# Patient Record
Sex: FEMALE | Race: WHITE
[De-identification: names, ages, dates, MRNs, and addresses within clinical notes are randomized per-mention and may not be internally consistent; named-entity substitution may affect disease eponyms.]

---

## 2017-12-20 LAB
CHLORIDE SERPL-SCNC: 109 MMOL/L (ref 98–110)
SODIUM SERPL-SCNC: 140 MMOL/L (ref 136–146)

## 2017-12-21 ENCOUNTER — HOSPITAL ENCOUNTER (OUTPATIENT)
Dept: HOSPITAL 56 - MW.SDS | Age: 37
LOS: 1 days | Discharge: HOME | End: 2017-12-22
Attending: OBSTETRICS & GYNECOLOGY
Payer: COMMERCIAL

## 2017-12-21 DIAGNOSIS — F32.9: ICD-10-CM

## 2017-12-21 DIAGNOSIS — M54.2: ICD-10-CM

## 2017-12-21 DIAGNOSIS — F41.9: ICD-10-CM

## 2017-12-21 DIAGNOSIS — Z88.8: ICD-10-CM

## 2017-12-21 DIAGNOSIS — Z87.891: ICD-10-CM

## 2017-12-21 DIAGNOSIS — Z88.2: ICD-10-CM

## 2017-12-21 DIAGNOSIS — G89.29: ICD-10-CM

## 2017-12-21 DIAGNOSIS — N92.1: Primary | ICD-10-CM

## 2017-12-21 DIAGNOSIS — G43.909: ICD-10-CM

## 2017-12-21 DIAGNOSIS — Z82.49: ICD-10-CM

## 2017-12-21 DIAGNOSIS — N39.3: ICD-10-CM

## 2017-12-21 DIAGNOSIS — Z98.51: ICD-10-CM

## 2017-12-21 DIAGNOSIS — Z88.7: ICD-10-CM

## 2017-12-21 DIAGNOSIS — Z83.3: ICD-10-CM

## 2017-12-21 DIAGNOSIS — Z91.012: ICD-10-CM

## 2017-12-21 DIAGNOSIS — Z79.899: ICD-10-CM

## 2017-12-21 PROCEDURE — 36415 COLL VENOUS BLD VENIPUNCTURE: CPT

## 2017-12-21 PROCEDURE — 58260 VAGINAL HYSTERECTOMY: CPT

## 2017-12-21 PROCEDURE — 94640 AIRWAY INHALATION TREATMENT: CPT

## 2017-12-21 PROCEDURE — 93005 ELECTROCARDIOGRAM TRACING: CPT

## 2017-12-21 PROCEDURE — 86901 BLOOD TYPING SEROLOGIC RH(D): CPT

## 2017-12-21 PROCEDURE — 85025 COMPLETE CBC W/AUTO DIFF WBC: CPT

## 2017-12-21 PROCEDURE — 57288 REPAIR BLADDER DEFECT: CPT

## 2017-12-21 PROCEDURE — 80048 BASIC METABOLIC PNL TOTAL CA: CPT

## 2017-12-21 PROCEDURE — C1781 MESH (IMPLANTABLE): HCPCS

## 2017-12-21 PROCEDURE — 86850 RBC ANTIBODY SCREEN: CPT

## 2017-12-21 PROCEDURE — 84703 CHORIONIC GONADOTROPIN ASSAY: CPT

## 2017-12-21 PROCEDURE — 86900 BLOOD TYPING SEROLOGIC ABO: CPT

## 2017-12-21 PROCEDURE — 85027 COMPLETE CBC AUTOMATED: CPT

## 2017-12-21 RX ADMIN — FENTANYL CITRATE PRN MCG: 50 INJECTION, SOLUTION INTRAMUSCULAR; INTRAVENOUS at 11:54

## 2017-12-21 RX ADMIN — OXYCODONE HYDROCHLORIDE AND ACETAMINOPHEN PRN TAB: 5; 325 TABLET ORAL at 14:16

## 2017-12-21 RX ADMIN — FENTANYL CITRATE PRN MCG: 50 INJECTION, SOLUTION INTRAMUSCULAR; INTRAVENOUS at 12:01

## 2017-12-21 RX ADMIN — KETOROLAC TROMETHAMINE PRN MG: 30 INJECTION, SOLUTION INTRAMUSCULAR at 18:35

## 2017-12-21 RX ADMIN — KETOROLAC TROMETHAMINE PRN MG: 30 INJECTION, SOLUTION INTRAMUSCULAR at 11:53

## 2017-12-21 NOTE — OR
SURGEON:

Gabriel Evans MD

 

DATE OF PROCEDURE:  12/21/2017

 

PREOPERATIVE DIAGNOSIS:

Menometrorrhagia, stress urinary incontinence.

 

POSTOPERATIVE DIAGNOSIS:

Menometrorrhagia, stress urinary incontinence.

 

OPERATION PERFORMED:

Total vaginal hysterectomy and tension-free vaginal tape being Solex tension-

free vaginal tape and cystoscopy.

 

ASSISTANT:

NEY Chavez.

 

ANESTHESIA:

General endotracheal intubation, Bart Camarillo and Dr. Steven.

 

ESTIMATED BLOOD LOSS:

100 mL.

 

COMPLICATIONS:

None.

 

FINDINGS:

Uterus about 8 weeks' size.  Both ovaries essentially normal.  Her tube

previously was removed from her previous tubal ligation.

 

INDICATIONS FOR SURGERY:

Hubbard refer to the admit note.

 

PROCEDURE IN DETAIL:

The patient was brought to the OR, properly identified, and prepped and draped

in sterile fashion as usual and after taken a time-out straight catheter was

used to empty the bladder.  Short weighted speculum placed in the vagina and

then circular incision in the vaginal mucosa was done.  Using electrocautery

port, the posterior cul-de-sac was entered posteriorly with a Hollingsworth scissor and

the peritoneum, and the vagina tacked posteriorly with 2-0 Vicryl and held for

further identification.  The uterosacral ligament was identified from both

sides, clamped with curved Zeppelin, transected, and suture ligated with 2-0

Vicryl pop-off.  Same thing was done with the cardinal ligament and then

cervicovesical space was entered anteriorly.  The bladder retracted completely

away from the operative field and the peritoneal cavity was entered anteriorly.

Broad ligament was clamped with curved zeppelin from both sides, transected, and

suture ligated with 2-0 Vicryl pop-off.  The uterine vessel suture ligated at

this step.  Next, the uterus was delivered posteriorly and then superior pedicle

was clamped with 90-degree zeppelin, transected.  Both ovaries looked normal and

they were preserved.  The superior pedicle was tied twice with 2-0 tie and the

tube was already removed.  The uterosacral ligament and cardinal ligament

anchored to the vagina at 3 and 9 o'clock for added vaginal support and then the

inspection of the operative field shows no oozing, no bleeding.  The vaginal

cuff was closed with 2-0 Vicryl interrupted figure-of-eight suture.  Once these

are done, attention was paid to the anterior vaginal wall and the anterior

vaginal beneath the urethra was infiltrated with copious amount of normal

saline.  It was incised in the midline using electrocautery and dissected in a

tunneling fashion to create a tunnel for the Solyx TVT.  Once that was created,

the Solyx TVT was anchored behind the pubic rami on both sides and with due

amount of tension to elevate the urethrovesical angle, and then we proceeded to

close the anterior vaginal incision with 2-0 Vicryl continuous interlocking

suture.  While we were doing that, we asked Anesthesia to give the patient

fluorescein and then cystoscopy was performed.  The bladder was intact.  Both

ureteric orifices were seen with the dye coming from both of them, thus the

patency of both ureters verified.  Satisfied with these findings, the procedure

ended.  Instrument and sponge count was correct.  The patient tolerated the

procedure well, went to recovery room in stable general condition.

 

 

HIGINIO CHARLES

DD:  12/21/2017 11:33:12

DT:  12/21/2017 12:14:37

Job #:  [id number]

## 2017-12-21 NOTE — PCM.PREANE
Preanesthetic Assessment





- Anesthesia/Transfusion/Family Hx


Anesthesia History: Prior Anesthesia Without Reaction


Family History of Anesthesia Reaction: No


Transfusion History: No Prior Transfusion(s)


Intubation History: Unknown





- Review of Systems


General: No Symptoms


Pulmonary: No Symptoms


Cardiovascular: No Symptoms


Gastrointestinal: No Symptoms


Neurological: No Symptoms


Other: Reports: None





- Physical Assessment


NPO Status Date: 12/20/17


NPO Status Time: 23:00


Height: 1.57 m


Weight: 116.573 kg


ASA Class: 2


Mental Status: Alert & Oriented x3


Airway Class: Mallampati = 2


Dentition: Reports: Broken Tooth/Teeth (x2 upper front incision), Missing Tooth/

Teeth (multiple missing upper and lower on borh sides)


Thyro-Mental Finger Breadths: 3


Mouth Opening Finger Breadths: 3


ROM/Head Extension: Full


Lungs: Clear to Auscultation, Normal Respiratory Effort


Cardiovascular: Regular Rate, Regular Rhythm





- Lab


Values: 





 Laboratory Last Values











WBC  10.15 K/uL (4.0-11.0)   12/20/17  08:45    


 


RBC  4.32 M/uL (4.30-5.90)   12/20/17  08:45    


 


Hgb  13.0 g/dL (12.0-16.0)   12/20/17  08:45    


 


Hct  38.2 % (36.0-46.0)   12/20/17  08:45    


 


MCV  88.4 fL (80.0-98.0)   12/20/17  08:45    


 


MCH  30.1 pg (27.0-32.0)   12/20/17  08:45    


 


MCHC  34.0 g/dL (31.0-37.0)   12/20/17  08:45    


 


RDW Std Deviation  42.6 fl (28.0-62.0)   12/20/17  08:45    


 


RDW Coeff of Rachid  13 % (11.0-15.0)   12/20/17  08:45    


 


Plt Count  283 K/uL (150-400)   12/20/17  08:45    


 


MPV  10.60 fL (7.40-12.00)   12/20/17  08:45    


 


Nucleated RBC %  0.0 /100WBC  12/20/17  08:45    


 


Nucleated RBCs #  0 K/uL  12/20/17  08:45    


 


Sodium  140 mmol/L (136-146)   12/20/17  08:45    


 


Potassium  3.9 mmol/L (3.5-5.1)   12/20/17  08:45    


 


Chloride  109 mmol/L ()   12/20/17  08:45    


 


Carbon Dioxide  24 mmol/L (21-31)   12/20/17  08:45    


 


BUN  9 mg/dL (6.0-23.0)   12/20/17  08:45    


 


Creatinine  0.9 mg/dL (0.6-1.5)   12/20/17  08:45    


 


Est Cr Clr Drug Dosing  68.35 mL/min  12/20/17  08:45    


 


Estimated GFR (MDRD)  > 60.0 ml/min  12/20/17  08:45    


 


Glucose  100 mg/dL ()   12/20/17  08:45    


 


Calcium  8.7 mg/dL (8.8-10.8)  L  12/20/17  08:45    


 


HCG, Qual  NEGATIVE  (NEG)   12/20/17  08:45    


 


Blood Type  B POSITIVE   12/20/17  08:45    


 


Antibody Screen  NEGATIVE   12/20/17  08:45    














- Allergies


Allergies/Adverse Reactions: 


 Allergies











Allergy/AdvReac Type Severity Reaction Status Date / Time


 


Influenza Virus Vaccines Allergy  Anaphylactic Verified 12/18/17 16:23





   Shock  


 


methylprednisolone Allergy  Rash Verified 12/18/17 16:23


 


Sulfa (Sulfonamide Allergy  Rash Verified 12/18/17 16:23





Antibiotics)     


 


eggs Allergy  Hives Uncoded 12/18/17 16:23














- Blood


Blood Available: No





- Anesthesia Plan


Pre-Op Medication Ordered: None





- Acknowledgements


Anesthesia Type Planned: General Anesthesia


Pt an Appropriate Candidate for the Planned Anesthesia: Yes


Alternatives and Risks of Anesthesia Discussed w Pt/Guardian: Yes


Pt/Guardian Understands and Agrees with Anesthesia Plan: Yes





PreAnesthesia Questionnaire


Other HEENT History: wears glasses/contacts


Respiratory History: Reports: Asthma (mainly exercize induced)


OB/GYN History: Reports: Endometriosis, Polycystic Ovaries


Musculoskeletal History: Reports: Back Pain, Chronic, Neck Pain, Chronic


Neurological History: Reports: Migraines


Psychiatric History: Reports: Anxiety, Depression


Endocrine/Metabolic History: Reports: Obesity/BMI 30+


Dermatologic History: Reports: Psoriasis





- Past Surgical History


GI Surgical History: Reports: Appendectomy, Cholecystectomy


Female  Surgical History: Reports: Tubal Ligation, Other (See Below)


Other Female  Surgeries/Procedures: laparoscopies x2





- SUBSTANCE USE


Smoking Status *Q: Former Smoker


Tobacco Use Within Last Twelve Months: Cigarettes


Recreational Drug Use History: No





- HOME MEDS


Home Medications: 


 Home Meds





Acetaminophen 1,000 mg PO Q6H PRN 12/18/17 [History]


Albuterol Sulfate [Proair Hfa] 1 - 2 puff INH Q4H PRN 12/18/17 [History]


Albuterol [Ventolin HFA] 1 - 2 puff INH Q4H PRN 12/18/17 [History]


Beclomethasone Dipropionate [Qvar] 1 puff INH BID 12/18/17 [History]


Celecoxib 200 mg PO DAILY 12/18/17 [History]


DULoxetine HCl [Duloxetine HCl] 90 mg PO DAILY 12/18/17 [History]


Ipratropium/Albuterol Sulfate [Iprat-Albut 0.5-3(2.5) mg/3 ml] 1 dose INH QID 12 /18/17 [History]


Methocarbamol 750 mg PO QID PRN 12/18/17 [History]


Topiramate 100 mg PO TID 12/18/17 [History]











- CURRENT (IN HOUSE) MEDS


Current Meds: 





 Current Medications





Lactated Ringer's (Ringers, Lactated)  1,000 mls @ 125 mls/hr IV ASDIRECTED JONI


   Last Admin: 12/21/17 09:02 Dose:  125 mls/hr


Sodium Chloride (Saline Flush)  10 ml FLUSH ASDIRECTED PRN


   PRN Reason: Keep Vein Open


Sodium Chloride (Saline Flush)  2.5 ml FLUSH ASDIRECTED PRN


   PRN Reason: Keep Vein Open





Discontinued Medications





Albuterol/Ipratropium (Duoneb 3.0-0.5 Mg/3 Ml)  3 ml NEB ONETIME ONE


   Stop: 12/21/17 07:13


Fluorescein Sodium (Ak-Fluor) Confirm Administered Dose 5 ml .ROUTE .STK-MED ONE


   Stop: 12/21/17 07:23


Cefazolin Sodium/Dextrose 2 gm (/ Premix)  50 mls @ 100 mls/hr IV ONETIME ONE


   Stop: 12/20/17 08:57

## 2017-12-21 NOTE — PCM.OPNOTE
- General Post-Op/Procedure Note


Date of Surgery/Procedure: 12/21/17


Operative Procedure(s): TVH,TVT and Cysto


Pre Op Diagnosis: Bleeding, JOAN


Post-Op Diagnosis: Same


Anesthesia Technique: General ET Tube


Primary Surgeon: Gabriel Evans


Assistant: Em Glez


EBL in mLs: 100


Complications: None


Condition: Good

## 2017-12-21 NOTE — PCM.POSTAN
POST ANESTHESIA ASSESSMENT





- MENTAL STATUS


Mental Status: Alert, Oriented





- RESPIRATORY


Respiratory Status: Respiratory Rate WNL, Airway Patent, O2 Saturation Stable





- CARDIOVASCULAR


CV Status: Pulse Rate WNL, Blood Pressure Stable





- GASTROINTESTINAL


GI Status: No Symptoms





- PAIN


Pain Score: 2





- POST OP HYDRATION


Hydration Status: Adequate & Stable





- OBSERVATIONS


Free Text/Narrative:: 





no anesthesia problems

## 2017-12-22 LAB
CHLORIDE SERPL-SCNC: 106 MMOL/L (ref 98–110)
SODIUM SERPL-SCNC: 137 MMOL/L (ref 136–146)

## 2017-12-22 RX ADMIN — OXYCODONE HYDROCHLORIDE AND ACETAMINOPHEN PRN TAB: 5; 325 TABLET ORAL at 10:51

## 2017-12-22 RX ADMIN — KETOROLAC TROMETHAMINE PRN MG: 30 INJECTION, SOLUTION INTRAMUSCULAR at 02:03

## 2017-12-22 NOTE — [id number]
Post Anesthesia Note





- EVALUATION WITHIN 48HRS OF ANESTHETIC


Vital Signs in Normal Range: Yes


Patient Participated in Evaluation: Yes


Respiratory Function Stable: Yes


Airway Patent: Yes


Cardiovascular Function Stable: Yes


Hydration Status Stable: Yes


Pain Control Satisfactory: Yes (States having bladder spasms)


Nausea and Vomiting Control Satisfactory: Yes (Eating crackers at bedside)


Mental Status Recovered: Yes

## 2017-12-22 NOTE — PCM.SURGPN
- General Info


Date of Service: 12/22/17


POD#: 1


Functional Status: Reports: Pain Controlled





- Review of Systems


General: Reports: No Symptoms


HEENT: Reports: No Symptoms


Pulmonary: Reports: No Symptoms


Cardiovascular: Reports: No Symptoms


Gastrointestinal: Reports: No Symptoms


Genitourinary: Reports: No Symptoms


Musculoskeletal: Reports: No Symptoms


Skin: Reports: No Symptoms


Neurological: Reports: No Symptoms


Psychiatric: Reports: No Symptoms





- Patient Data


Vitals - Most Recent: 


 Last Vital Signs











Temp  35.6 C   12/22/17 08:32


 


Pulse  83   12/22/17 08:32


 


Resp  20   12/22/17 08:32


 


BP  127/89   12/22/17 08:32


 


Pulse Ox  96   12/22/17 08:32











Weight - Most Recent: 116.573 kg


I&O - Last 24 Hours: 


 Intake & Output











 12/21/17 12/22/17 12/22/17





 22:59 06:59 14:59


 


Intake Total 653 2089 


 


Output Total 550 2150 


 


Balance 103 -61 











Lab Results Last 24 Hrs: 


 Laboratory Results - last 24 hr











  12/22/17 12/22/17 Range/Units





  05:14 05:14 


 


WBC  15.38 H   (4.0-11.0)  K/uL


 


RBC  3.84 L   (4.30-5.90)  M/uL


 


Hgb  11.6 L   (12.0-16.0)  g/dL


 


Hct  33.6 L   (36.0-46.0)  %


 


MCV  87.5   (80.0-98.0)  fL


 


MCH  30.2   (27.0-32.0)  pg


 


MCHC  34.5   (31.0-37.0)  g/dL


 


RDW Std Deviation  40.8   (28.0-62.0)  fl


 


RDW Coeff of Rachid  13   (11.0-15.0)  %


 


Plt Count  280   (150-400)  K/uL


 


MPV  10.40   (7.40-12.00)  fL


 


Neut % (Auto)  75.0   (48.0-80.0)  %


 


Lymph % (Auto)  17.6   (16.0-40.0)  %


 


Mono % (Auto)  7.3   (0.0-15.0)  %


 


Eos % (Auto)  0.0   (0.0-7.0)  %


 


Baso % (Auto)  0.1   (0.0-1.5)  %


 


Neut # (Auto)  11.5 H   (1.4-5.7)  K/uL


 


Lymph # (Auto)  2.7 H   (0.6-2.4)  K/uL


 


Mono # (Auto)  1.1 H   (0.0-0.8)  K/uL


 


Eos # (Auto)  0.0   (0.0-0.7)  K/uL


 


Baso # (Auto)  0.0   (0.0-0.1)  K/uL


 


Nucleated RBC %  0.0   /100WBC


 


Nucleated RBCs #  0   K/uL


 


Sodium   137  (136-146)  mmol/L


 


Potassium   3.7  (3.5-5.1)  mmol/L


 


Chloride   106  ()  mmol/L


 


Carbon Dioxide   24  (21-31)  mmol/L


 


BUN   6  (6.0-23.0)  mg/dL


 


Creatinine   0.7  (0.6-1.5)  mg/dL


 


Est Cr Clr Drug Dosing   87.87  mL/min


 


Estimated GFR (MDRD)   > 60.0  ml/min


 


Glucose   109  ()  mg/dL


 


Calcium   8.8  (8.8-10.8)  mg/dL











Med Orders - Current: 


 Current Medications





Fentanyl (Sublimaze)  50 mcg IVPUSH Q5M PRN


   PRN Reason: Pain (severe 7-10)


   Stop: 12/22/17 09:38


   Last Admin: 12/21/17 12:01 Dose:  50 mcg


Lactated Ringer's (Ringers, Lactated)  1,000 mls @ 125 mls/hr IV ASDIRECTED [facility]


   Last Admin: 12/21/17 22:16 Dose:  125 mls/hr


Ketorolac Tromethamine (Toradol)  30 mg IVPUSH Q6H PRN


   PRN Reason: Pain (severe 7-10)


   Stop: 12/26/17 11:24


   Last Admin: 12/22/17 02:03 Dose:  30 mg


Morphine Sulfate (Morphine)  2 mg IVPUSH Q2H PRN


   PRN Reason: Pain (severe 7-10)


   Last Admin: 12/21/17 16:23 Dose:  2 mg


Morphine Sulfate (Morphine)  4 mg IVPUSH Q2H PRN


   PRN Reason: Pain (severe 7-10)


   Last Admin: 12/21/17 12:17 Dose:  4 mg


Ondansetron HCl (Zofran)  4 mg IVPUSH Q6H PRN


   PRN Reason: Nausea/Vomiting


   Last Admin: 12/21/17 21:05 Dose:  4 mg


Oxycodone/Acetaminophen (Percocet 325-5 Mg)  1 tab PO Q4H PRN


   PRN Reason: Pain (moderate 4-6)


   Last Admin: 12/21/17 21:03 Dose:  1 tab


Oxycodone/Acetaminophen (Percocet 325-5 Mg)  2 tab PO Q4H PRN


   PRN Reason: Pain (moderate 4-6)


   Last Admin: 12/21/17 14:16 Dose:  2 tab


Promethazine HCl (Phenergan)  25 mg IM Q6H PRN


   PRN Reason: Nausea/Vomiting


Sodium Chloride (Saline Flush)  10 ml FLUSH ASDIRECTED PRN


   PRN Reason: Keep Vein Open


Sodium Chloride (Saline Flush)  2.5 ml FLUSH ASDIRECTED PRN


   PRN Reason: Keep Vein Open





Discontinued Medications





Albuterol (Proventil Neb Soln) Confirm Administered Dose 2.5 mg .ROUTE .STK-MED 

ONE


   Stop: 12/21/17 09:30


   Last Admin: 12/21/17 09:32 Dose:  Not Given


Albuterol/Ipratropium (Duoneb 3.0-0.5 Mg/3 Ml)  3 ml NEB ONETIME ONE


   Stop: 12/21/17 07:13


   Last Admin: 12/21/17 09:32 Dose:  3 ml


Albuterol/Ipratropium (Duoneb 3.0-0.5 Mg/3 Ml) Confirm Administered Dose 3 ml 

.ROUTE .STK-MED ONE


   Stop: 12/21/17 09:32


   Last Admin: 12/21/17 13:05 Dose:  Not Given


Belladonna Alkaloids/Opium (B & O Supprettes No. 15a)  1 supp RECTAL ONETIME ONE


   Stop: 12/21/17 12:25


   Last Admin: 12/21/17 12:30 Dose:  1 supp


Belladonna Alkaloids/Opium (B & O Supprettes No. 15a) Confirm Administered Dose 

1 supp .ROUTE .STK-MED ONE


   Stop: 12/21/17 12:26


   Last Admin: 12/21/17 13:05 Dose:  Not Given


Cefazolin Sodium (Ancef) Confirm Administered Dose 2 gm .ROUTE .STK-MED ONE


   Stop: 12/21/17 09:48


Dexamethasone (Dexamethasone) Confirm Administered Dose 20 mg .ROUTE .STK-MED 

ONE


   Stop: 12/21/17 10:49


Ephedrine Sulfate (Ephedrine Sulfate) Confirm Administered Dose 50 mg .ROUTE 

.STK-MED ONE


   Stop: 12/21/17 10:50


Fentanyl (Sublimaze) Confirm Administered Dose 250 mcg .ROUTE .STK-MED ONE


   Stop: 12/21/17 09:46


Fentanyl (Sublimaze) Confirm Administered Dose 100 mcg .ROUTE .STK-MED ONE


   Stop: 12/21/17 10:41


Fentanyl (Sublimaze) Confirm Administered Dose 100 mcg .ROUTE .STK-MED ONE


   Stop: 12/21/17 11:02


Fluorescein Sodium (Ak-Fluor) Confirm Administered Dose 5 ml .ROUTE .STK-MED ONE


   Stop: 12/21/17 07:23


Furosemide (Lasix) Confirm Administered Dose 40 mg .ROUTE .STK-MED ONE


   Stop: 12/21/17 10:39


Glycopyrrolate () Confirm Administered Dose 1 mg .ROUTE .STK-MED ONE


   Stop: 12/21/17 10:42


Cefazolin Sodium/Dextrose 2 gm (/ Premix)  50 mls @ 100 mls/hr IV ONETIME ONE


   Stop: 12/20/17 08:57


   Last Admin: 12/21/17 13:05 Dose:  Not Given


Sodium Chloride (Normal Saline) Confirm Administered Dose 20 mls @ as directed 

.ROUTE .STK-MED ONE


   Stop: 12/21/17 09:48


Ketorolac Tromethamine (Toradol)  30 mg IVPUSH ONETIME ONE


   Stop: 12/21/17 11:25


   Last Admin: 12/21/17 13:05 Dose:  Not Given


Lidocaine (Xylocaine-Mpf 2%) Confirm Administered Dose 5 ml .ROUTE .STK-MED ONE


   Stop: 12/21/17 09:45


Midazolam HCl (Versed 1 Mg/Ml) Confirm Administered Dose 2 mg .ROUTE .STK-MED 

ONE


   Stop: 12/21/17 09:46


Neostigmine Methylsulfate (Neostigmine) Confirm Administered Dose 5 mg .ROUTE 

.STK-MED ONE


   Stop: 12/21/17 10:42


Ondansetron HCl (Zofran) Confirm Administered Dose 4 mg .ROUTE .STK-MED ONE


   Stop: 12/21/17 09:45


Propofol (Diprivan  20 Ml) Confirm Administered Dose 200 mg .ROUTE .STK-MED ONE


   Stop: 12/21/17 09:46


Rocuronium Bromide (Zemuron) Confirm Administered Dose 100 mg .ROUTE .STK-MED 

ONE


   Stop: 12/21/17 09:45


Succinylcholine Chloride (Succinylcholine In Ns Pf) Confirm Administered Dose 

200 mg .ROUTE .STK-MED ONE


   Stop: 12/21/17 09:45











- Exam


Wound/Incisions: Healing Well


General: Alert, Oriented


HEENT: Pupils Equal


Neck: Supple


Lungs: Clear to Auscultation, Normal Respiratory Effort


Cardiovascular: Regular Rate, Regular Rhythm


GI/Abdominal Exam: Normal Bowel Sounds, Soft, Non-Tender, No Organomegaly, No 

Distention, No Abnormal Bruit, No Mass, Pelvis Stable


Extremities: Normal Inspection, Normal Range of Motion, Non-Tender, No Pedal 

Edema, Normal Capillary Refill


Skin: Warm, Dry, Intact


Neurological: No New Focal Deficit


Psy/Mental Status: Alert, Normal Affect, Normal Mood





- Problem List Review


Problem List Initiated/Reviewed/Updated: Yes





- My Orders


Last 24 Hours: 


 Active Orders 24 hr











 Category Date Time Status


 


 Patient Status [ADT] Routine ADT  12/21/17 11:24 Active


 


 Notify Provider Vital Signs [RC] ASDIRECTED Care  12/21/17 11:24 Active


 


 Oxygen Therapy [RC] ASDIRECTED Care  12/21/17 11:24 Active


 


 RT Incentive Spirometry [RC] Q2HWA Care  12/21/17 11:24 Active


 


 Up With Assistance [RC] PER UNIT ROUTINE Care  12/21/17 11:24 Active


 


 Up ad Consuelo [RC] PER UNIT ROUTINE Care  12/21/17 11:24 Active


 


 Regular Diet [DIET] Diet  12/21/17 Dinner Active


 


 Acetaminophen/oxyCODONE [Percocet 325-5 MG] Med  12/21/17 11:24 Active





 1 tab PO Q4H PRN   


 


 Acetaminophen/oxyCODONE [Percocet 325-5 MG] Med  12/21/17 11:24 Active





 2 tab PO Q4H PRN   


 


 Ketorolac [Toradol] Med  12/21/17 11:24 Active





 30 mg IVPUSH Q6H PRN   


 


 Morphine Med  12/21/17 11:24 Active





 2 mg IVPUSH Q2H PRN   


 


 Morphine Med  12/21/17 11:24 Active





 4 mg IVPUSH Q2H PRN   


 


 Ondansetron [Zofran] Med  12/21/17 11:24 Active





 4 mg IVPUSH Q6H PRN   


 


 Promethazine [Phenergan] Med  12/21/17 11:24 Active





 25 mg IM Q6H PRN   


 


 fentaNYL [Sublimaze] Med  12/21/17 09:38 Active





 50 mcg IVPUSH Q5M PRN   


 


 Peripheral IV Discontinue [OM.PC] Routine Oth  12/21/17 11:24 Ordered


 


 Sequential Compression Device [OM.PC] Per Unit Routine Oth  12/21/17 11:24 

Ordered


 


 Resuscitation Status Routine Resus Stat  12/21/17 11:24 Ordered








 Medication Orders





Fentanyl (Sublimaze)  50 mcg IVPUSH Q5M PRN


   PRN Reason: Pain (severe 7-10)


   Stop: 12/22/17 09:38


   Last Admin: 12/21/17 12:01  Dose: 50 mcg


   Admin: 12/21/17 11:54  Dose: 50 mcg


Lactated Ringer's (Ringers, Lactated)  1,000 mls @ 125 mls/hr IV ASDIRECTED JONI


   Last Admin: 12/21/17 22:16  Dose: 125 mls/hr


   Infusion: 12/21/17 21:28  Dose: 125 mls/hr


   Admin: 12/21/17 13:28  Dose: 125 mls/hr


   Infusion: 12/21/17 13:28  Dose: 125 mls/hr


   Admin: 12/21/17 09:02  Dose: 125 mls/hr


Ketorolac Tromethamine (Toradol)  30 mg IVPUSH Q6H PRN


   PRN Reason: Pain (severe 7-10)


   Stop: 12/26/17 11:24


   Last Admin: 12/22/17 02:03  Dose: 30 mg


   Admin: 12/21/17 18:35  Dose: 30 mg


   Admin: 12/21/17 11:53  Dose: 30 mg


Morphine Sulfate (Morphine)  2 mg IVPUSH Q2H PRN


   PRN Reason: Pain (severe 7-10)


   Last Admin: 12/21/17 16:23  Dose: 2 mg


Morphine Sulfate (Morphine)  4 mg IVPUSH Q2H PRN


   PRN Reason: Pain (severe 7-10)


   Last Admin: 12/21/17 12:17  Dose: 4 mg


Ondansetron HCl (Zofran)  4 mg IVPUSH Q6H PRN


   PRN Reason: Nausea/Vomiting


   Last Admin: 12/21/17 21:05  Dose: 4 mg


Oxycodone/Acetaminophen (Percocet 325-5 Mg)  1 tab PO Q4H PRN


   PRN Reason: Pain (moderate 4-6)


   Last Admin: 12/21/17 21:03  Dose: 1 tab


Oxycodone/Acetaminophen (Percocet 325-5 Mg)  2 tab PO Q4H PRN


   PRN Reason: Pain (moderate 4-6)


   Last Admin: 12/21/17 14:16  Dose: 2 tab


Promethazine HCl (Phenergan)  25 mg IM Q6H PRN


   PRN Reason: Nausea/Vomiting


Sodium Chloride (Saline Flush)  10 ml FLUSH ASDIRECTED PRN


   PRN Reason: Keep Vein Open


Sodium Chloride (Saline Flush)  2.5 ml FLUSH ASDIRECTED PRN


   PRN Reason: Keep Vein Open











- Assessment


Assessment           (Free Text/Narrative):: 





Status post vaginal hysterectomy and patient is doing well on regular diet and 

voiding without any problem no vaginal bleeding her lab work is within normal 

limits





- Plan


Plan                        (Free Text/Narrative):: 





I'm sending the patient home today prescription for Percocet 7.5/325 for 

postoperative pain is given she is to come to see me in the office in 2 weeks

## 2017-12-22 NOTE — [id number].DCSUM1
**Discharge Summary





- Discharge Data


Discharge Date: 12/22/17


Discharge Disposition: Home, Self-Care 01


Condition: Good





- Patient Summary/Data


Operative Procedure(s) Performed: TVH,TVT and Cysto





- Patient Instructions


Diet: Usual Diet as Tolerated


Activity: As Tolerated


Driving: Do Not Drive


Showering/Bathing: May Shower


Notify Provider of: Fever, Increased Pain, Nausea and/or Vomiting





- Discharge Plan


Home Medications: 


 Home Meds





Acetaminophen 1,000 mg PO Q6H PRN 12/18/17 [History]


Albuterol Sulfate [Proair Hfa] 1 - 2 puff INH Q4H PRN 12/18/17 [History]


Albuterol [Ventolin HFA] 1 - 2 puff INH Q4H PRN 12/18/17 [History]


Beclomethasone Dipropionate [Qvar] 1 puff INH BID 12/18/17 [History]


Celecoxib 200 mg PO DAILY 12/18/17 [History]


DULoxetine HCl [Duloxetine HCl] 90 mg PO DAILY 12/18/17 [History]


Ipratropium/Albuterol Sulfate [Iprat-Albut 0.5-3(2.5) mg/3 ml] 1 dose INH QID 12 /18/17 [History]


Methocarbamol 750 mg PO QID PRN 12/18/17 [History]


Topiramate 100 mg PO TID 12/18/17 [History]








Patient Handouts:  Acetaminophen; Oxycodone tablets, Laparoscopically Assisted 

Vaginal Hysterectomy, Care After


Referrals: 


Gabriel Evans MD [Physician] - 01/02/18 10:45 am





- General Info


Date of Service: 12/22/17


Functional Status: Reports: Pain Controlled





- Review of Systems


General: Reports: No Symptoms


HEENT: Reports: No Symptoms


Pulmonary: Reports: No Symptoms


Cardiovascular: Reports: No Symptoms


Gastrointestinal: Reports: No Symptoms


Genitourinary: Reports: No Symptoms


Musculoskeletal: Reports: No Symptoms


Skin: Reports: No Symptoms


Neurological: Reports: No Symptoms


Psychiatric: Reports: No Symptoms





- Patient Data


Vitals - Most Recent: 


 Last Vital Signs











Temp  35.6 C   12/22/17 08:32


 


Pulse  83   12/22/17 08:32


 


Resp  20   12/22/17 08:32


 


BP  127/89   12/22/17 08:32


 


Pulse Ox  96   12/22/17 08:32











Weight - Most Recent: 116.573 kg


I&O - Last 24 hours: 


 Intake & Output











 12/21/17 12/22/17 12/22/17





 22:59 06:59 14:59


 


Intake Total 653 2089 


 


Output Total 550 2150 


 


Balance 103 -61 











Lab Results - Last 24 hrs: 


 Laboratory Results - last 24 hr











  12/22/17 12/22/17 Range/Units





  05:14 05:14 


 


WBC  15.38 H   (4.0-11.0)  K/uL


 


RBC  3.84 L   (4.30-5.90)  M/uL


 


Hgb  11.6 L   (12.0-16.0)  g/dL


 


Hct  33.6 L   (36.0-46.0)  %


 


MCV  87.5   (80.0-98.0)  fL


 


MCH  30.2   (27.0-32.0)  pg


 


MCHC  34.5   (31.0-37.0)  g/dL


 


RDW Std Deviation  40.8   (28.0-62.0)  fl


 


RDW Coeff of Rachid  13   (11.0-15.0)  %


 


Plt Count  280   (150-400)  K/uL


 


MPV  10.40   (7.40-12.00)  fL


 


Neut % (Auto)  75.0   (48.0-80.0)  %


 


Lymph % (Auto)  17.6   (16.0-40.0)  %


 


Mono % (Auto)  7.3   (0.0-15.0)  %


 


Eos % (Auto)  0.0   (0.0-7.0)  %


 


Baso % (Auto)  0.1   (0.0-1.5)  %


 


Neut # (Auto)  11.5 H   (1.4-5.7)  K/uL


 


Lymph # (Auto)  2.7 H   (0.6-2.4)  K/uL


 


Mono # (Auto)  1.1 H   (0.0-0.8)  K/uL


 


Eos # (Auto)  0.0   (0.0-0.7)  K/uL


 


Baso # (Auto)  0.0   (0.0-0.1)  K/uL


 


Nucleated RBC %  0.0   /100WBC


 


Nucleated RBCs #  0   K/uL


 


Sodium   137  (136-146)  mmol/L


 


Potassium   3.7  (3.5-5.1)  mmol/L


 


Chloride   106  ()  mmol/L


 


Carbon Dioxide   24  (21-31)  mmol/L


 


BUN   6  (6.0-23.0)  mg/dL


 


Creatinine   0.7  (0.6-1.5)  mg/dL


 


Est Cr Clr Drug Dosing   87.87  mL/min


 


Estimated GFR (MDRD)   > 60.0  ml/min


 


Glucose   109  ()  mg/dL


 


Calcium   8.8  (8.8-10.8)  mg/dL











Med Orders - Current: 


 Current Medications





Fentanyl (Sublimaze)  50 mcg IVPUSH Q5M PRN


   PRN Reason: Pain (severe 7-10)


   Stop: 12/22/17 09:38


   Last Admin: 12/21/17 12:01 Dose:  50 mcg


Lactated Ringer's (Ringers, Lactated)  1,000 mls @ 125 mls/hr IV ASDIRECTED [facility]


   Last Admin: 12/21/17 22:16 Dose:  125 mls/hr


Ketorolac Tromethamine (Toradol)  30 mg IVPUSH Q6H PRN


   PRN Reason: Pain (severe 7-10)


   Stop: 12/26/17 11:24


   Last Admin: 12/22/17 02:03 Dose:  30 mg


Morphine Sulfate (Morphine)  2 mg IVPUSH Q2H PRN


   PRN Reason: Pain (severe 7-10)


   Last Admin: 12/21/17 16:23 Dose:  2 mg


Morphine Sulfate (Morphine)  4 mg IVPUSH Q2H PRN


   PRN Reason: Pain (severe 7-10)


   Last Admin: 12/21/17 12:17 Dose:  4 mg


Ondansetron HCl (Zofran)  4 mg IVPUSH Q6H PRN


   PRN Reason: Nausea/Vomiting


   Last Admin: 12/21/17 21:05 Dose:  4 mg


Oxycodone/Acetaminophen (Percocet 325-5 Mg)  1 tab PO Q4H PRN


   PRN Reason: Pain (moderate 4-6)


   Last Admin: 12/21/17 21:03 Dose:  1 tab


Oxycodone/Acetaminophen (Percocet 325-5 Mg)  2 tab PO Q4H PRN


   PRN Reason: Pain (moderate 4-6)


   Last Admin: 12/21/17 14:16 Dose:  2 tab


Promethazine HCl (Phenergan)  25 mg IM Q6H PRN


   PRN Reason: Nausea/Vomiting


Sodium Chloride (Saline Flush)  10 ml FLUSH ASDIRECTED PRN


   PRN Reason: Keep Vein Open


Sodium Chloride (Saline Flush)  2.5 ml FLUSH ASDIRECTED PRN


   PRN Reason: Keep Vein Open





Discontinued Medications





Albuterol (Proventil Neb Soln) Confirm Administered Dose 2.5 mg .ROUTE .STK-MED 

ONE


   Stop: 12/21/17 09:30


   Last Admin: 12/21/17 09:32 Dose:  Not Given


Albuterol/Ipratropium (Duoneb 3.0-0.5 Mg/3 Ml)  3 ml NEB ONETIME ONE


   Stop: 12/21/17 07:13


   Last Admin: 12/21/17 09:32 Dose:  3 ml


Albuterol/Ipratropium (Duoneb 3.0-0.5 Mg/3 Ml) Confirm Administered Dose 3 ml 

.ROUTE .STK-MED ONE


   Stop: 12/21/17 09:32


   Last Admin: 12/21/17 13:05 Dose:  Not Given


Belladonna Alkaloids/Opium (B & O Supprettes No. 15a)  1 supp RECTAL ONETIME ONE


   Stop: 12/21/17 12:25


   Last Admin: 12/21/17 12:30 Dose:  1 supp


Belladonna Alkaloids/Opium (B & O Supprettes No. 15a) Confirm Administered Dose 

1 supp .ROUTE .STK-MED ONE


   Stop: 12/21/17 12:26


   Last Admin: 12/21/17 13:05 Dose:  Not Given


Cefazolin Sodium (Ancef) Confirm Administered Dose 2 gm .ROUTE .STK-MED ONE


   Stop: 12/21/17 09:48


Dexamethasone (Dexamethasone) Confirm Administered Dose 20 mg .ROUTE .STK-MED 

ONE


   Stop: 12/21/17 10:49


Ephedrine Sulfate (Ephedrine Sulfate) Confirm Administered Dose 50 mg .ROUTE 

.STK-MED ONE


   Stop: 12/21/17 10:50


Fentanyl (Sublimaze) Confirm Administered Dose 250 mcg .ROUTE .STK-MED ONE


   Stop: 12/21/17 09:46


Fentanyl (Sublimaze) Confirm Administered Dose 100 mcg .ROUTE .STK-MED ONE


   Stop: 12/21/17 10:41


Fentanyl (Sublimaze) Confirm Administered Dose 100 mcg .ROUTE .STK-MED ONE


   Stop: 12/21/17 11:02


Fluorescein Sodium (Ak-Fluor) Confirm Administered Dose 5 ml .ROUTE .STK-MED ONE


   Stop: 12/21/17 07:23


Furosemide (Lasix) Confirm Administered Dose 40 mg .ROUTE .STK-MED ONE


   Stop: 12/21/17 10:39


Glycopyrrolate () Confirm Administered Dose 1 mg .ROUTE .STK-MED ONE


   Stop: 12/21/17 10:42


Cefazolin Sodium/Dextrose 2 gm (/ Premix)  50 mls @ 100 mls/hr IV ONETIME ONE


   Stop: 12/20/17 08:57


   Last Admin: 12/21/17 13:05 Dose:  Not Given


Sodium Chloride (Normal Saline) Confirm Administered Dose 20 mls @ as directed 

.ROUTE .STK-MED ONE


   Stop: 12/21/17 09:48


Ketorolac Tromethamine (Toradol)  30 mg IVPUSH ONETIME ONE


   Stop: 12/21/17 11:25


   Last Admin: 12/21/17 13:05 Dose:  Not Given


Lidocaine (Xylocaine-Mpf 2%) Confirm Administered Dose 5 ml .ROUTE .STK-MED ONE


   Stop: 12/21/17 09:45


Midazolam HCl (Versed 1 Mg/Ml) Confirm Administered Dose 2 mg .ROUTE .STK-MED 

ONE


   Stop: 12/21/17 09:46


Neostigmine Methylsulfate (Neostigmine) Confirm Administered Dose 5 mg .ROUTE 

.STK-MED ONE


   Stop: 12/21/17 10:42


Ondansetron HCl (Zofran) Confirm Administered Dose 4 mg .ROUTE .STK-MED ONE


   Stop: 12/21/17 09:45


Propofol (Diprivan  20 Ml) Confirm Administered Dose 200 mg .ROUTE .STK-MED ONE


   Stop: 12/21/17 09:46


Rocuronium Bromide (Zemuron) Confirm Administered Dose 100 mg .ROUTE .STK-MED 

ONE


   Stop: 12/21/17 09:45


Succinylcholine Chloride (Succinylcholine In Ns Pf) Confirm Administered Dose 

200 mg .ROUTE .STK-MED ONE


   Stop: 12/21/17 09:45











- Exam


General: Reports: Alert, Oriented


HEENT: Reports: Pupils Equal, Pupils Reactive, EOMI, Mucous Membr. Moist/Pink


Neck: Reports: Supple


Lungs: Reports: Clear to Auscultation, Normal Respiratory Effort


Cardiovascular: Reports: Regular Rate, Regular Rhythm


GI/Abdominal Exam: Normal Bowel Sounds, Soft, Non-Tender, No Organomegaly, No 

Distention, No Abnormal Bruit, No Mass, Pelvis Stable


 (Female) Exam: Normal External Exam, Normal Speculum Exam, Normal Bimanual 

Exam


Rectal (Female) Exam: Normal Exam, Normal Rectal Tone


Back Exam: Reports: Normal Inspection, Full Range of Motion


Extremities: Normal Inspection, Normal Range of Motion, Non-Tender, No Pedal 

Edema, Normal Capillary Refill


Skin: Reports: Warm, Dry, Intact


Wound/Incisions: Reports: Healing Well


Neurological: Reports: No New Focal Deficit


Psy/Mental Status: Reports: Alert, Normal Affect, Normal Mood





*Q Meaningful Use (DIS)





- VTE *Q


VTE Criteria *Q: 








- Stroke *Q


Stroke Criteria *Q: 








- AMI *Q


AMI Criteria *Q:

## 2018-04-05 ENCOUNTER — HOSPITAL ENCOUNTER (OUTPATIENT)
Dept: HOSPITAL 56 - MW.SDS | Age: 38
Discharge: HOME | End: 2018-04-05
Attending: SURGERY
Payer: COMMERCIAL

## 2018-04-05 DIAGNOSIS — E66.9: ICD-10-CM

## 2018-04-05 DIAGNOSIS — F32.9: ICD-10-CM

## 2018-04-05 DIAGNOSIS — Z90.49: ICD-10-CM

## 2018-04-05 DIAGNOSIS — Z88.8: ICD-10-CM

## 2018-04-05 DIAGNOSIS — Z87.891: ICD-10-CM

## 2018-04-05 DIAGNOSIS — F41.9: ICD-10-CM

## 2018-04-05 DIAGNOSIS — K21.9: ICD-10-CM

## 2018-04-05 DIAGNOSIS — K31.89: ICD-10-CM

## 2018-04-05 DIAGNOSIS — E28.2: ICD-10-CM

## 2018-04-05 DIAGNOSIS — Z98.890: ICD-10-CM

## 2018-04-05 DIAGNOSIS — J45.909: ICD-10-CM

## 2018-04-05 DIAGNOSIS — Z88.7: ICD-10-CM

## 2018-04-05 DIAGNOSIS — K52.9: Primary | ICD-10-CM

## 2018-04-05 PROCEDURE — 43239 EGD BIOPSY SINGLE/MULTIPLE: CPT

## 2018-04-05 PROCEDURE — 45378 DIAGNOSTIC COLONOSCOPY: CPT

## 2018-04-05 NOTE — OR
SURGEON:

MAGUE FRENCH MD

 

DATE OF PROCEDURE:  04/05/2018

 

PREOPERATIVE DIAGNOSES:

1. Reflux.

2. Chronic diarrhea.

 

POSTOPERATIVE DIAGNOSES:

Normal esophagogastroduodenoscopy and colonoscopy.

 

ENDOSCOPIST:

Mague French MD.

 

ANESTHESIA:

Monitored anesthesia care.

 

INSTRUMENT USED:

Olympus endoscope and colonoscope.

 

EXTENT OF EXAM:

To the second portion of duodenum, to the cecum.

 

PREPARATION:

Fair.

 

LIMITATIONS:

None.

 

INDICATION FOR EXAMINATION:

The patient is a 37-year-old female, who presents with chronic diarrhea.  She

had her gallbladder removed several years ago and ever since then, has been

having 10 to 20 loose bowel movements per day.  On further questioning, the

patient also complains of reflux, which she does not treat with any over-the-

counter medications.  The patient and I discussed the need for diagnostic EGD

and colonoscopy.  We discussed the procedures as well as the expected

perioperative course.  We discussed the risks including bleeding, infection, or

damage to surrounding structures including perforation.  The patient verbalized

understanding and wishes to proceed.

 

PROCEDURE IN DETAIL:

The patient was brought to the endoscopy suite and placed in the left lateral

decubitus position.  A time-out was completed verifying the patient's name, age,

date of birth, allergies, and procedure to be performed.  Monitored anesthesia

care was induced and a bite block was placed in the patient's mouth.  Continuous

oxygen was provided via nasal cannula throughout the procedure.  After adequate

sedation was achieved, a well lubricated endoscope was placed in the patient's

mouth and advanced under direct visualization to the second portion of the

duodenum.  This appeared normal and a photograph was taken.  The scope was then

fully withdrawn while examining the color, texture, anatomy, and integrity of

the mucosa of the upper GI tract.  The duodenum appeared normal.  The scope was

then brought into the stomach.  Photograph was taken of the pylorus as well as

the GE junction.  The patient had some bilious appearing clear fluid in the

stomach at the time of the scope.  This was suctioned out.  The gastric mucosa

was free of inflammation or any ulceration.  Biopsies were taken of the gastric

antrum, body, and fundus, and sent for H. pylori testing and histologic review.

The scope was then brought into the distal esophagus.  A photograph was taken of

the GE junction, which appeared normal.  I closely inspected the distal

esophageal mucosa.  There was no evidence of esophagitis.  The remainder of the

esophageal mucosa appeared normal.  The scope was removed and this portion of

procedure was terminated.  A digital rectal exam was performed.  This exam was

within normal limits.  A well lubricated colonoscope was inserted in the rectum

and advanced under direct visualization to the level of the cecum.  The cecum

was identified by both visual and anatomic landmarks.  A photograph was taken of

the cecal cap.  I was unable to retroflex the scope within the cecum due to

looping of the scope more proximally.  The scope was then fully withdrawn while

examining the color, texture, anatomy, integrity, mucosa from the cecum to the

anal canal.  The patient had a fair prep and required an irrigation in order to

adequately visualize the walls of the colon.  After careful inspection, however,

the colonic mucosa appeared normal with no evidence of inflammation or

ulceration.  No polyps were seen.  The scope was then brought into the rectum

and retroflexed to allow visualization of the anal canal opening.  This appeared

normal and a photograph was taken.  The scope was then straightened out and

removed from the patient.  The cecum to anus time was 7 minutes.  The patient

tolerated the procedure well and was taken to PACU in stable condition.

 

ENDOSCOPIC DIAGNOSES:

Normal appearing esophagogastroduodenoscopy and colonoscopy.  Follow up with the

patient in clinic in 2 weeks to discuss these findings.

 

 

YESIKA CHARLES

DD:  04/05/2018 09:57:49

DT:  04/05/2018 13:06:21

Job #:  [id number]

## 2018-04-05 NOTE — [id number]
Post Anesthesia Note





- EVALUATION WITHIN 48HRS OF ANESTHETIC


Vital Signs in Normal Range: Yes


Patient Participated in Evaluation: Yes


Respiratory Function Stable: Yes


Airway Patent: Yes


Cardiovascular Function Stable: Yes


Hydration Status Stable: Yes


Pain Control Satisfactory: Yes


Nausea and Vomiting Control Satisfactory: Yes


Mental Status Recovered: Yes


Resp Rate: 19





- COMMENTS/OBSERVATIONS


Free Text/Narrative:: 





no anesthesia problems

## 2018-04-05 NOTE — PCM.OPNOTE
- General Post-Op/Procedure Note


Date of Surgery/Procedure: 04/05/18


Operative Procedure(s): Diagnostic EGD and colonoscopy


Findings: 


Normal EGD and colonoscopy 


Pre Op Diagnosis: GERD, chronic diarrhea


Post-Op Diagnosis: Normal EGD and colonoscopy


Anesthesia Technique: MAC


Primary Surgeon: Mague French


Condition: Good

## 2019-03-17 ENCOUNTER — HOSPITAL ENCOUNTER (EMERGENCY)
Dept: HOSPITAL 56 - MW.ED | Age: 39
Discharge: HOME | End: 2019-03-17
Payer: COMMERCIAL

## 2019-03-17 DIAGNOSIS — Z91.012: ICD-10-CM

## 2019-03-17 DIAGNOSIS — F32.9: ICD-10-CM

## 2019-03-17 DIAGNOSIS — Z79.899: ICD-10-CM

## 2019-03-17 DIAGNOSIS — Z88.8: ICD-10-CM

## 2019-03-17 DIAGNOSIS — Z88.7: ICD-10-CM

## 2019-03-17 DIAGNOSIS — Z88.2: ICD-10-CM

## 2019-03-17 DIAGNOSIS — F41.9: ICD-10-CM

## 2019-03-17 DIAGNOSIS — J40: Primary | ICD-10-CM

## 2019-03-17 PROCEDURE — 99285 EMERGENCY DEPT VISIT HI MDM: CPT

## 2019-03-17 PROCEDURE — 94640 AIRWAY INHALATION TREATMENT: CPT

## 2019-03-17 PROCEDURE — 71046 X-RAY EXAM CHEST 2 VIEWS: CPT

## 2019-03-17 NOTE — EDM.PDOC
ED HPI GENERAL MEDICAL PROBLEM





- General


Chief Complaint: Respiratory Problem


Stated Complaint: HEADACHE


Time Seen by Provider: 03/17/19 03:10


Source of Information: Reports: Patient





- History of Present Illness


INITIAL COMMENTS - FREE TEXT/NARRATIVE: 





HISTORY AND PHYSICAL:


History of present illness:


[Patient with asthma presents with persistent we she does have duo nebs at home 

and is on a steroid she did been seen by her primary in the last week and 

declined an antibiotic at that time





She has improved with DuoNeb and Solu-Medrol here





No fever nausea vomiting chills sweats able speak in full sentences nonlabored 

breathing] patient does have a persistent cough





Review of systems: 


As per history of present illness and below otherwise all systems reviewed and 

negative.


Past medical history: 


As per history of present illness and as reviewed below otherwise 

noncontributory.


Surgical history: 


As per history of present illness and as reviewed below otherwise 

noncontributory.


Social history: 


No reported history of drug or alcohol abuse.


Family history: 


As per history of present illness and as reviewed below otherwise 

noncontributory.


Physical exam:


HEENT: Atraumatic, normocephalic, pupils reactive, negative for conjunctival 

pallor or scleral icterus, mucous membranes moist, throat clear, neck supple, 

nontender, trachea midline.


Lungs: Clear to auscultation on inspiration slight end expiratory wheeze, 

breath sounds equal bilaterally, chest nontender.


Heart: S1S2, regular, negative for clicks, rubs, or JVD.


Abdomen: Soft, nondistended, nontender. Negative for masses or 

hepatosplenomegaly. Negative for costovertebral tenderness.


Pelvis: Stable nontender.


Genitourinary: Deferred.


Rectal: Deferred.


Extremities: Atraumatic, negative for cords or calf pain. Neurovascular 

unremarkable.


Neuro: Awake, alert, oriented. Cranial nerves II through XII unremarkable. 

Cerebellum unremarkable. Motor and sensory unremarkable throughout. Exam 

nonfocal.





Diagnostics:


[Chest x-ray plain films


]


Therapeutics:


[DuoNeb


Solu-Medrol 125 mg IM


Azithromycin 500 mg by mouth now


] Juni





Impression: 


[ bronchitis


Chronic history of baseline ]


Definitive disposition and diagnosis as appropriate pending reevaluation and 

review of above.


  ** chest from coughing


Pain Score (Numeric/FACES): 2





- Related Data


 Allergies











Allergy/AdvReac Type Severity Reaction Status Date / Time


 


Influenza Virus Vaccines Allergy  Anaphylactic Verified 04/02/18 14:32





   Shock  


 


methylprednisolone Allergy  Vomiting Verified 04/02/18 14:32


 


Sulfa (Sulfonamide Allergy  Rash Verified 04/02/18 14:32





Antibiotics)     


 


eggs Allergy  Hives Uncoded 04/02/18 14:32











Home Meds: 


 Home Meds





Albuterol Sulfate [Proair Hfa] 1 - 2 puff INH Q4H PRN 12/18/17 [History]


DULoxetine HCl [Duloxetine HCl] 120 mg PO DAILY 12/18/17 [History]


Methocarbamol 750 mg PO QID PRN 12/18/17 [History]


Topiramate 100 mg PO TID 12/18/17 [History]


LORazepam 0.5 - 1 mg PO DAILY PRN 04/02/18 [History]











Past Medical History


HEENT History: Reports: Impaired Vision


Other HEENT History: wears glasses/contacts


Respiratory History: Reports: Asthma


Other Respiratory History: uses rescue inhaler mainly in the winter


Gastrointestinal History: Reports: Chronic Diarrhea, GERD, Helicobacter Pylori


OB/GYN History: Reports: Endometriosis, Polycystic Ovaries


Musculoskeletal History: Reports: Back Pain, Chronic, Neck Pain, Chronic


Neurological History: Reports: Migraines


Psychiatric History: Reports: Anxiety, Depression


Endocrine/Metabolic History: Reports: Obesity/BMI 30+


Dermatologic History: Reports: Psoriasis





- Infectious Disease History


Infectious Disease History: Reports: Chicken Pox, Shingles





- Past Surgical History


GI Surgical History: Reports: Appendectomy, Cholecystectomy


Female  Surgical History: Reports: Hysterectomy


Other Female  Surgeries/Procedures: laparoscopies x2





Social & Family History





- Family History


Family Medical History: Noncontributory





- Tobacco Use


Smoking Status *Q: Never Smoker





- Caffeine Use


Caffeine Use: Reports: Coffee





- Recreational Drug Use


Recreational Drug Use: No





ED ROS GENERAL





- Review of Systems


Review Of Systems: See Below





ED EXAM, GENERAL





- Physical Exam


Exam: See Below





Course





- Vital Signs


Last Recorded V/S: 


 Last Vital Signs











Temp  96.9 F   03/17/19 02:44


 


Pulse  98   03/17/19 02:44


 


Resp  16   03/17/19 02:44


 


BP  143/96 H  03/17/19 02:44


 


Pulse Ox  98   03/17/19 02:44














- Orders/Labs/Meds


Orders: 


 Active Orders 24 hr











 Category Date Time Status


 


 RT Aerosol Therapy [RC] ASDIRECTED Care  03/17/19 03:06 Active


 


 Azithromycin [Zithromax] Med  03/17/19 04:20 Stat





 500 mg PO NOW STA   











Meds: 


Medications














Discontinued Medications














Generic Name Dose Route Start Last Admin





  Trade Name Landry  PRN Reason Stop Dose Admin


 


Albuterol/Ipratropium  3 ml  03/17/19 03:06  03/17/19 03:11





  Duoneb 3.0-0.5 Mg/3 Ml  NEB  03/17/19 03:07  3 ml





  ONETIME ONE   Administration





     





     





     





     














Departure





- Departure


Time of Disposition: 04:22


Disposition: Home, Self-Care 01


Condition: Good


Clinical Impression: 


 Bronchitis, Pulmonary infiltrate on chest x-ray








- Discharge Information


Referrals: 


PCP,None [Primary Care Provider] - 


Forms:  ED Department Discharge


Additional Instructions: 


The following information is given to patients seen in the emergency department 

who are being discharged to home. This information is to outline your options 

for follow-up care. We provide all patients seen in our emergency department 

with a follow-up referral.





The need for follow-up, as well as the timing and circumstances, are variable 

depending upon the specifics of your emergency department visit.





If you don't have a primary care physician on staff, we will provide you with a 

referral. We always advise you to contact your personal physician following an 

emergency department visit to inform them of the circumstance of the visit and 

for follow-up with them and/or the need for any referrals to a consulting 

specialist.





The emergency department will also refer you to a specialist when appropriate. 

This referral assures that you have the opportunity for follow-up care with a 

specialist. All of these measure are taken in an effort to provide you with 

optimal care, which includes your follow-up.





Under all circumstances we always encourage you to contact your private 

physician who remains a resource for coordinating your care. When calling for 

follow-up care, please make the office aware that this follow-up is from your 

recent emergency room visit. If for any reason you are refused follow-up, 

please contact the [facility] emergency department at [phone] 

and asked to speak to the emergency department charge nurse.








- My Orders


Last 24 Hours: 


My Active Orders





03/17/19 03:06


RT Aerosol Therapy [RC] ASDIRECTED 





03/17/19 04:20


Azithromycin [Zithromax]   500 mg PO NOW STA 














- Assessment/Plan


Last 24 Hours: 


My Active Orders





03/17/19 03:06


RT Aerosol Therapy [RC] ASDIRECTED 





03/17/19 04:20


Azithromycin [Zithromax]   500 mg PO NOW STA

## 2019-03-17 NOTE — CR
INDICATION:



Shortness of breath



TECHNIQUE:



Chest 2 views.



COMPARISON:



None



FINDINGS:



Lung volumes are low which accentuates the cardiac size. Normal pulmonary 

vasculature. No focal infiltrate, effusion, or pneumothorax. No acute 

osseous abnormality. 



IMPRESSION:



No sign of acute disease.



Dictated by Simona Arambula MD @ Mar 17 2019  3:44AM



Signed by Dr. Simona Arambula @ Mar 17 2019  3:44AM

## 2019-06-13 ENCOUNTER — HOSPITAL ENCOUNTER (OUTPATIENT)
Dept: HOSPITAL 56 - MW.SDS | Age: 39
End: 2019-06-13
Attending: PAIN MEDICINE
Payer: COMMERCIAL

## 2019-06-13 DIAGNOSIS — M12.88: Primary | ICD-10-CM

## 2019-06-13 PROCEDURE — G0260 INJ FOR SACROILIAC JT ANESTH: HCPCS

## 2019-06-13 NOTE — OR
SURGEON:

Sinai Fox D.O.

 

DATE OF PROCEDURE:  2019

 

PRIMARY SURGEON:

Sinai Fox D.O.

 

OPERATING ROOM STAFF PRESENT:

1. ALEXA Bello.

2. NURY Jerez RN.

3. STERLING Morillo RT.

 

WOUND CLASS:

I.

 

PREOPERATIVE DIAGNOSIS:

Right sacroiliac joint arthropathy.

 

POSTOPERATIVE DIAGNOSIS:

Right sacroiliac joint arthropathy.

 

PROCEDURE PERFORMED:

1. Right sacroiliac joint injection.

2. Fluoroscopic guidance for needle placement.

3. Local with oral Valium for sedation.

 

SCREENING QUESTIONS:

The patient answered "No" to all the followin. Are you allergic to iodine, Betadine or latex?

2. Do you have a bleeding disorder?

3. Do you  have any joint replacements, heart valve replacements or a

    pacemaker?

4. Are you on any anti-inflammatories or blood thinners?

5. Do you have any current local or systemic infections?

 

MEDICAL NECESSITY:

This is a patient with a history of severe chronic  low back pain and sacroiliac

joint irritation with pain over the sacral sulcus and the buttocks region who

comes in for the above diagnostic and therapeutic procedure.  Please see medical

necessity note attached.  This procedure is being done in accordance with

guidelines as written by the [employer] (ROSSANA).

 

DESCRIPTION OF PROCEDURE:

The patient had the procedure thoroughly explained including all possible risks,

benefits and alternatives.  Consent was signed in my clinic indicating

understanding and willingness to proceed.

 

The patient presented to the outpatient [facility] and was escorted to the

dressing room to disrobe and change into a hospital gown.  Preoperative vital

signs were taken and stable.  The patient reported that Valium was taken prior

to the procedure.

 

The patient was brought to the procedure room and placed in the prone position

on the procedure room table.  A pillow was placed under the hips in order to

flatten the lumbar lordosis.  The back was prepped with ChloraPrep and sterilely

draped.

 

All personnel in the operating room were dressed in appropriate attire including

surgical scrubs, head and shoe covers.  This was to ensure sterility while in

the treatment room.  During the time fluoroscopy was in use all personnel in the

operating room wore lead shields with thyroid collars.  Sterile technique was

used during the procedure.  The patient was awake and conversant throughout the

procedure.

 

The fluoroscope was positioned to provide an oblique view of the sacroiliac

joint.  There was no evidence of infection at the site of needle insertion.  The

skin was anesthetized with 2% Lidocaine with a sterile 27-gauge 1.5 inch needle.

Then under fluoroscopy  a 22-gauge 3.5 inch spinal needle was placed within the

sacroiliac joint in the lower one-third of the joint.  IsoVue-200 contrast dye

was injected under live fluoroscopy and no intravascular flow pattern was

observed.  After negative aspiration of heme, the following solution was

injected: 0.5% Ropivacaine, Celestone and 2% Lidocaine.

 

The patient tolerated the procedure well and vital signs were stable during and

after the procedure.

 

The staff escorted the patient to the recovery area and the patient was released

to home in stable condition after a brief stay in the recovery room monitored by

the nurse.  The patient was given both oral and written discharge and follow up

instructions.

 

Recommended follow up in two weeks.  The patient is able to contact the office

if there are any additional problems or questions in the meantime.  The patient

was given discharge instruction and verbalizes understanding including

understanding of those signs and symptoms that would require emergency care.

 

PREOPERATIVE PAIN:

10.

 

POSTOPERATIVE PAIN:

1/10.

 

FOLLOWUP:

Follow up in the Pain Clinic in 3 weeks.

 

 

FAITH / ARACELI

DD:  2019 14:08:01

DT:  2019 20:11:52

Job #:  [id number]

## 2020-01-26 ENCOUNTER — HOSPITAL ENCOUNTER (EMERGENCY)
Dept: HOSPITAL 56 - MW.ED | Age: 40
Discharge: HOME | End: 2020-01-26
Payer: COMMERCIAL

## 2020-01-26 DIAGNOSIS — Z91.012: ICD-10-CM

## 2020-01-26 DIAGNOSIS — Z88.2: ICD-10-CM

## 2020-01-26 DIAGNOSIS — F41.9: ICD-10-CM

## 2020-01-26 DIAGNOSIS — Z79.899: ICD-10-CM

## 2020-01-26 DIAGNOSIS — J45.901: ICD-10-CM

## 2020-01-26 DIAGNOSIS — J15.9: Primary | ICD-10-CM

## 2020-01-26 DIAGNOSIS — E66.9: ICD-10-CM

## 2020-01-26 DIAGNOSIS — F17.210: ICD-10-CM

## 2020-01-26 DIAGNOSIS — F32.9: ICD-10-CM

## 2020-01-26 DIAGNOSIS — Z88.7: ICD-10-CM

## 2020-01-26 DIAGNOSIS — Z88.8: ICD-10-CM

## 2020-01-26 LAB
BUN SERPL-MCNC: 20 MG/DL (ref 7–18)
CHLORIDE SERPL-SCNC: 101 MMOL/L (ref 98–107)
CO2 SERPL-SCNC: 21.2 MMOL/L (ref 21–32)
GLUCOSE SERPL-MCNC: 95 MG/DL (ref 74–106)
POTASSIUM SERPL-SCNC: 4.3 MMOL/L (ref 3.5–5.1)
SODIUM SERPL-SCNC: 135 MMOL/L (ref 136–145)

## 2020-01-26 PROCEDURE — 81003 URINALYSIS AUTO W/O SCOPE: CPT

## 2020-01-26 PROCEDURE — 36415 COLL VENOUS BLD VENIPUNCTURE: CPT

## 2020-01-26 PROCEDURE — 85025 COMPLETE CBC W/AUTO DIFF WBC: CPT

## 2020-01-26 PROCEDURE — 84484 ASSAY OF TROPONIN QUANT: CPT

## 2020-01-26 PROCEDURE — 96360 HYDRATION IV INFUSION INIT: CPT

## 2020-01-26 PROCEDURE — 99285 EMERGENCY DEPT VISIT HI MDM: CPT

## 2020-01-26 PROCEDURE — 93005 ELECTROCARDIOGRAM TRACING: CPT

## 2020-01-26 PROCEDURE — 80053 COMPREHEN METABOLIC PANEL: CPT

## 2020-01-26 PROCEDURE — 94640 AIRWAY INHALATION TREATMENT: CPT

## 2020-01-26 PROCEDURE — 71046 X-RAY EXAM CHEST 2 VIEWS: CPT

## 2020-01-26 NOTE — EDM.PDOC
ED HPI GENERAL MEDICAL PROBLEM





- General


Chief Complaint: Respiratory Problem


Stated Complaint: BREATHING PROBLEM


Time Seen by Provider: 01/26/20 18:21


Source of Information: Reports: Patient


History Limitations: Reports: No Limitations





- History of Present Illness


INITIAL COMMENTS - FREE TEXT/NARRATIVE: 


HISTORY AND PHYSICAL:





History of present illness:


Patient is a 39-year-old female who presents to the ED today with concern of 

cough making her feel short of breath.  Patient states she has a history of 

asthma and that she has been having issues with the cough over the past 4 to 5 

days.  Patient states she also feels as if she has had a fever but has not 

checked a temperature at home.  Patient states that she did call her primary 

care provider and was put on a steroid.  Patient states that she was not seen 

or evaluated by the primary care provider.  Patient states she is taking medrol 

dose pack and this is the third day of taking it.  Patient states that despite 

taking the medrol dose pack she feels as if she has been more wheezy and has 

coughing attacks which makes her feel short of breath.  Patient denies any 

other symptoms or concerns.





Patient denies chest pain. Denies headache, neck stiff ness, change in vision, 

syncope, or near syncope. Denies nausea, vomiting, abdominal pain, diarrhea, 

constipation, or dysuria. Has not noted any blood in urine or stool. Patient 

has been eating and drinking appropriately.





Review of systems: 


As per history of present illness and below otherwise all systems reviewed and 

negative.





Past medical history: 


As per history of present illness and as reviewed below otherwise 

noncontributory.





Surgical history: 


As per history of present illness and as reviewed below otherwise 

noncontributory.





Social history: 


See social history for further information





Family history: 


As per history of present illness and as reviewed below otherwise 

noncontributory.





Physical exam:


General: Patient is alert, oriented, and in no acute distress. Patient sitting 

comfortably on exam table breathing comfortably.


HEENT: Atraumatic, normocephalic, pupils equal and reactive bilaterally, 

negative for conjunctival pallor or scleral icterus, mucous membranes moist, 

TMs normal bilaterally, throat clear, neck supple, nontender, trachea midline. 

No drooling or trismus noted. No meningeal signs. No hot potato voice noted. 


Lungs: Diffuse wheezing to auscultation, breath sounds equal bilaterally, chest 

nontender.


Heart: S1S2, regular rate and rhythm without overt murmur


Abdomen: Soft, nondistended, nontender. Negative for masses or 

hepatosplenomegaly. Negative for costovertebral tenderness.


Pelvis: Stable nontender.


Genitourinary: Deferred.


Rectal: Deferred.


Skin: Intact, warm, dry. No lesions or rashes noted.


Extremities: Atraumatic, negative for cords or calf pain. Neurovascular 

unremarkable.


Neuro: Awake, alert, oriented. Cranial nerves II through XII unremarkable. 

Cerebellum unremarkable. Motor and sensory unremarkable throughout. Exam 

nonfocal.





Notes:


Patient 98% on RA. Initially tachycardic at 120s upon arrival. Following 

therapeutics, rate 95.


Admission for observation was offered to haley  but patient declines. All 

risks vs benefits discussed with patient and expresses understanding.


Patient states she does have 3 more nebulizers at home and would need a new 

prescription for these.


Patient currently on medrol dose pack but will switch to Prednisone.


Discussed importance for follow-up with a primary care provider.


Voices understanding and is agreeable to plan of care. Denies any further 

questions or concerns at this time.





Diagnostics:


CBC, CMP, UA, EKG, CXR, Influenza





Therapeutics:


Duoneb, NS





Prescription:


Prednisone, Duonebs, Levaquin





Impression: 


Community acquired pneumonia, left lobe


Asthma exacerbation





Plan:


1.  Take medication as prescribed.  Stop the Medrol Dosepak that has been 

prescribed to you and take new prescription.


2.  You can alternate ibuprofen and Tylenol as directed for pain and discomfort.


3.  Use your asthma medications as prescribed to you as directed and as 

discussed.


4.  Return to the ED as needed and as discussed.





Definitive disposition and diagnosis as appropriate pending reevaluation and 

review of above.





  ** Chest


Pain Score (Numeric/FACES): 5





- Related Data


 Allergies











Allergy/AdvReac Type Severity Reaction Status Date / Time


 


Influenza Virus Vaccines Allergy  Anaphylactic Verified 01/26/20 18:15





   Shock  


 


methylprednisolone Allergy  Vomiting Verified 01/26/20 18:15





[From Solu-Medrol]     


 


Sulfa (Sulfonamide Allergy  Rash Verified 01/26/20 18:15





Antibiotics)     


 


eggs Allergy  Hives Uncoded 01/26/20 18:15











Home Meds: 


 Home Meds





Albuterol Sulfate [Proair Hfa] 1 - 2 puff INH Q4H PRN 12/18/17 [History]


DULoxetine HCl [Duloxetine HCl] 120 mg PO DAILY 12/18/17 [History]


Topiramate 100 mg PO TID 12/18/17 [History]


methocarbamoL [Methocarbamol] 750 mg PO QID PRN 12/18/17 [History]


LORazepam 0.5 - 1 mg PO DAILY PRN 04/02/18 [History]


Albuterol [Proventil Neb Soln] 3 ml NEB ASDIRECTED 01/26/20 [History]


Albuterol/Ipratropium [DuoNeb 3.0-0.5 MG/3 ML] 3 ml NEB ASDIRECTED 01/26/20 [

History]


methylPREDNISolone [Medrol Dose Pack] mg PO ASDIRECTED 01/26/20 [History]











Past Medical History


HEENT History: Reports: Impaired Vision


Other HEENT History: wears glasses/contacts


Respiratory History: Reports: Asthma


Other Respiratory History: uses rescue inhaler mainly in the winter


Gastrointestinal History: Reports: Chronic Diarrhea, GERD, Helicobacter Pylori


OB/GYN History: Reports: Endometriosis, Polycystic Ovaries


Musculoskeletal History: Reports: Back Pain, Chronic, Neck Pain, Chronic


Neurological History: Reports: Migraines


Psychiatric History: Reports: Anxiety, Depression


Endocrine/Metabolic History: Reports: Obesity/BMI 30+


Dermatologic History: Reports: Psoriasis





- Infectious Disease History


Infectious Disease History: Reports: Chicken Pox





- Past Surgical History


GI Surgical History: Reports: Appendectomy, Cholecystectomy


Female  Surgical History: Reports: Hysterectomy


Other Female  Surgeries/Procedures: laparoscopies x2





Social & Family History





- Family History


Family Medical History: Noncontributory





- Tobacco Use


Smoking Status *Q: Current Every Day Smoker


Years of Tobacco use: 26


Packs/Tins Daily: 0.5





- Caffeine Use


Caffeine Use: Reports: Coffee, Soda, Tea





- Recreational Drug Use


Recreational Drug Use: No





ED ROS GENERAL





- Review of Systems


Review Of Systems: Comprehensive ROS is negative, except as noted in HPI.





ED EXAM, GENERAL





- Physical Exam


Exam: See Below (see dictation)





Course





- Vital Signs


Last Recorded V/S: 


 Last Vital Signs











Temp  96.8 F   01/26/20 18:16


 


Pulse  105 H  01/26/20 19:08


 


Resp  21 H  01/26/20 19:08


 


BP  117/87   01/26/20 19:08


 


Pulse Ox  99   01/26/20 19:08














- Orders/Labs/Meds


Orders: 


 Active Orders 24 hr











 Category Date Time Status


 


 EKG Documentation Completion [RC] STAT Care  01/26/20 18:26 Active


 


 RT Aerosol Therapy [RC] ASDIRECTED Care  01/26/20 18:27 Active











Labs: 


 Laboratory Tests











  01/26/20 01/26/20 01/26/20 Range/Units





  18:34 18:34 19:35 


 


WBC  13.01 H    (4.0-11.0)  K/uL


 


RBC  5.01    (4.30-5.90)  M/uL


 


Hgb  15.6    (12.0-16.0)  g/dL


 


Hct  45.3    (36.0-46.0)  %


 


MCV  90.4    (80.0-98.0)  fL


 


MCH  31.1    (27.0-32.0)  pg


 


MCHC  34.4    (31.0-37.0)  g/dL


 


RDW Std Deviation  46.3    (28.0-62.0)  fl


 


RDW Coeff of Rachid  14    (11.0-15.0)  %


 


Plt Count  282    (150-400)  K/uL


 


MPV  10.60    (7.40-12.00)  fL


 


Neut % (Auto)  79.6    (48.0-80.0)  %


 


Lymph % (Auto)  8.3 L    (16.0-40.0)  %


 


Mono % (Auto)  11.8    (0.0-15.0)  %


 


Eos % (Auto)  0.1    (0.0-7.0)  %


 


Baso % (Auto)  0.2    (0.0-1.5)  %


 


Neut # (Auto)  10.4 H    (1.4-5.7)  K/uL


 


Lymph # (Auto)  1.1    (0.6-2.4)  K/uL


 


Mono # (Auto)  1.5 H    (0.0-0.8)  K/uL


 


Eos # (Auto)  0.0    (0.0-0.7)  K/uL


 


Baso # (Auto)  0.0    (0.0-0.1)  K/uL


 


Nucleated RBC %  0.0    /100WBC


 


Nucleated RBCs #  0    K/uL


 


Sodium   135 L   (136-145)  mmol/L


 


Potassium   4.3   (3.5-5.1)  mmol/L


 


Chloride   101   ()  mmol/L


 


Carbon Dioxide   21.2   (21.0-32.0)  mmol/L


 


BUN   20 H   (7.0-18.0)  mg/dL


 


Creatinine   1.1 H   (0.6-1.0)  mg/dL


 


Est Cr Clr Drug Dosing   57.36   mL/min


 


Estimated GFR (MDRD)   55.6   ml/min


 


Glucose   95   ()  mg/dL


 


Calcium   9.4   (8.5-10.1)  mg/dL


 


Total Bilirubin   0.3   (0.2-1.0)  mg/dL


 


AST   18   (15-37)  IU/L


 


ALT   28   (14-63)  IU/L


 


Alkaline Phosphatase   113   ()  U/L


 


Troponin I   < 0.050   (0.000-0.056)  ng/mL


 


Total Protein   8.2   (6.4-8.2)  g/dL


 


Albumin   3.7   (3.4-5.0)  g/dL


 


Globulin   4.5 H   (2.6-4.0)  g/dL


 


Albumin/Globulin Ratio   0.8 L   (0.9-1.6)  


 


Urine Color    YELLOW  


 


Urine Appearance    CLEAR  


 


Urine pH    6.0  (5.0-8.0)  


 


Ur Specific Gravity    1.010  (1.001-1.035)  


 


Urine Protein    NEGATIVE  (NEGATIVE)  mg/dL


 


Urine Glucose (UA)    NEGATIVE  (NEGATIVE)  mg/dL


 


Urine Ketones    NEGATIVE  (NEGATIVE)  mg/dL


 


Urine Occult Blood    NEGATIVE  (NEGATIVE)  


 


Urine Nitrite    NEGATIVE  (NEGATIVE)  


 


Urine Bilirubin    NEGATIVE  (NEGATIVE)  


 


Urine Urobilinogen    0.2  (<2.0)  EU/dL


 


Ur Leukocyte Esterase    NEGATIVE  (NEGATIVE)  











Meds: 


Medications














Discontinued Medications














Generic Name Dose Route Start Last Admin





  Trade Name Landry  PRN Reason Stop Dose Admin


 


Albuterol/Ipratropium  3 ml  01/26/20 18:27  01/26/20 18:31





  Duoneb 3.0-0.5 Mg/3 Ml  NEB  01/26/20 18:28  3 ml





  ONETIME ONE   Administration





     





     





     





     


 


Sodium Chloride  1,000 mls @ 999 mls/hr  01/26/20 18:26  01/26/20 18:39





  Normal Saline  IV  01/26/20 19:26  999 mls/hr





  BOLUS ONE   Administration





     





     





     





     














Departure





- Departure


Time of Disposition: 19:52


Disposition: Home, Self-Care 01


Clinical Impression: 


 Community acquired bacterial pneumonia





Asthma exacerbation


Qualifiers:


 Asthma severity: unspecified severity Asthma persistence: unspecified 

Qualified Code(s): J45.901 - Unspecified asthma with (acute) exacerbation








- Discharge Information


Referrals: 


Duy Ortega MD [Primary Care Provider] - 


Forms:  ED Department Discharge


Additional Instructions: 


The following information is given to patients seen in the emergency department 

who are being discharged to home. This information is to outline your options 

for follow-up care. We provide all patients seen in our emergency department 

with a follow-up referral.





The need for follow-up, as well as the timing and circumstances, are variable 

depending upon the specifics of your emergency department visit.





If you don't have a primary care physician on staff, we will provide you with a 

referral. We always advise you to contact your personal physician following an 

emergency department visit to inform them of the circumstance of the visit and 

for follow-up with them and/or the need for any referrals to a consulting 

specialist.





The emergency department will also refer you to a specialist when appropriate. 

This referral assures that you have the opportunity for follow-up care with a 

specialist. All of these measure are taken in an effort to provide you with 

optimal care, which includes your follow-up.





Under all circumstances we always encourage you to contact your private 

physician who remains a resource for coordinating your care. When calling for 

follow-up care, please make the office aware that this follow-up is from your 

recent emergency room visit. If for any reason you are refused follow-up, 

please contact the [facility] Emergency 

Department at [phone] and asked to speak to the emergency department 

charge nurse.





[facility]


Primary Care


[address] [place] [id number]


Phone: [phone]


Fax: [phone]





[address] [address]


Phone: [phone]


Fax: [phone]





1.  Take medication as prescribed.  Stop the Medrol Dosepak that has been 

prescribed to you and take new prescription.


2.  You can alternate ibuprofen and Tylenol as directed for pain and discomfort.


3.  Use your asthma medications as prescribed to you as directed and as 

discussed.


4.  Return to the ED as needed and as discussed.





 











Sepsis Event Note





- Evaluation


Sepsis Screening Result: No Definite Risk





- Focused Exam


Vital Signs: 


 Vital Signs











  Temp Pulse Resp BP Pulse Ox


 


 01/26/20 19:08   105 H  21 H  117/87  99


 


 01/26/20 18:16  96.8 F  114 H  18  149/96 H  98











Date Exam was Performed: 01/26/20


Time Exam was Performed: 19:48





- My Orders


Last 24 Hours: 


My Active Orders





01/26/20 18:26


EKG Documentation Completion [RC] STAT 





01/26/20 18:27


RT Aerosol Therapy [RC] ASDIRECTED 














- Assessment/Plan


Last 24 Hours: 


My Active Orders





01/26/20 18:26


EKG Documentation Completion [RC] STAT 





01/26/20 18:27


RT Aerosol Therapy [RC] ASDIRECTED

## 2020-01-26 NOTE — CR
--- Chest: 2 views of the chest are obtained.

 

Comparison: Prior chest x-ray of 03/17/19.

 

Increased density is noted within the lingula.  This appears slightly 

more prominent than expected for an area of atelectasis and could 

represent small area of pneumonia.  Lungs otherwise are clear.  Heart 

size and mediastinum are normal.  Bony structures are unremarkable.  

Surgical clips are seen within the upper abdomen.

 

Impression:

1.  Increased density within the lingula as described above.

2.  Other findings which are believed to be incidental.

 

Diagnostic code #3

 

Study was dictated in [place] Standard Time